# Patient Record
Sex: FEMALE | Race: WHITE | NOT HISPANIC OR LATINO | Employment: UNEMPLOYED | URBAN - NONMETROPOLITAN AREA
[De-identification: names, ages, dates, MRNs, and addresses within clinical notes are randomized per-mention and may not be internally consistent; named-entity substitution may affect disease eponyms.]

---

## 2020-09-15 ENCOUNTER — TRANSCRIBE ORDERS (OUTPATIENT)
Dept: ADMINISTRATIVE | Facility: HOSPITAL | Age: 35
End: 2020-09-15

## 2020-09-15 DIAGNOSIS — R51.9 HEADACHE AROUND THE EYES: Primary | ICD-10-CM

## 2020-09-24 ENCOUNTER — APPOINTMENT (OUTPATIENT)
Dept: CT IMAGING | Facility: HOSPITAL | Age: 35
End: 2020-09-24

## 2020-10-02 ENCOUNTER — APPOINTMENT (OUTPATIENT)
Dept: CT IMAGING | Facility: HOSPITAL | Age: 35
End: 2020-10-02

## 2022-04-14 ENCOUNTER — OFFICE VISIT (OUTPATIENT)
Dept: OBSTETRICS AND GYNECOLOGY | Facility: CLINIC | Age: 37
End: 2022-04-14

## 2022-04-14 VITALS
SYSTOLIC BLOOD PRESSURE: 122 MMHG | HEIGHT: 65 IN | BODY MASS INDEX: 36.69 KG/M2 | DIASTOLIC BLOOD PRESSURE: 86 MMHG | WEIGHT: 220.2 LBS

## 2022-04-14 DIAGNOSIS — Z01.419 WOMEN'S ANNUAL ROUTINE GYNECOLOGICAL EXAMINATION: Primary | ICD-10-CM

## 2022-04-14 PROCEDURE — 99385 PREV VISIT NEW AGE 18-39: CPT | Performed by: OBSTETRICS & GYNECOLOGY

## 2022-04-14 RX ORDER — NORGESTIMATE AND ETHINYL ESTRADIOL 0.25-0.035
1 KIT ORAL DAILY
Qty: 90 TABLET | Refills: 4 | Status: SHIPPED | OUTPATIENT
Start: 2022-04-14 | End: 2023-02-27 | Stop reason: SDUPTHER

## 2022-04-14 RX ORDER — NORGESTIMATE AND ETHINYL ESTRADIOL 0.25-0.035
1 KIT ORAL DAILY
COMMUNITY
Start: 2022-03-08 | End: 2022-04-14 | Stop reason: SDUPTHER

## 2022-04-14 NOTE — PROGRESS NOTES
GYN Annual Exam     CC - Here for annual exam. Patient is a new patient.     Subjective   HPI  Erica Hunter is a 36 y.o. female, , who presents for annual well woman exam.   Patient's last menstrual period was 2022 (within days). Periods are regular every 25-35 days, lasting 4-5 days. The patient uses less than 1 of tampons/pads per hour.   Dysmenorrhea:mild, occurring first 1-2 days of flow.  Patient reports problems with: none.    Partner Status: Marital Status: . Desires STD Screening: no.  HPV vaccinated? : no    Additional OB/GYN History   Current contraception: contraceptive methods: OCP (estrogen/progesterone)  Desires to: continue contraception    Last Pap :  - Normal - Completed at [facility]  Last Completed Pap Smear     This patient has no relevant Health Maintenance data.        History of abnormal Pap smear: no  Family history of uterine, colon, breast, or ovarian cancer: yes - MGM and PGM - breast cancer   Performs monthly Self-Breast Exam: yes  Exercises Regularly:yes  Feelings of Anxiety or Depression: yes - Anxiety - managed per pt   Tobacco Usage?: No   OB History        3    Para   3    Term   3            AB        Living   3       SAB        IAB        Ectopic        Molar        Multiple        Live Births   3                Health Maintenance   Topic Date Due   • Annual Gynecologic Pelvic and Breast Exam  Never done   • ANNUAL PHYSICAL  Never done   • COVID-19 Vaccine (1) Never done   • TDAP/TD VACCINES (1 - Tdap) Never done   • HEPATITIS C SCREENING  Never done   • PAP SMEAR  Never done   • INFLUENZA VACCINE  2022   • Pneumococcal Vaccine 0-64  Aged Out       The additional following portions of the patient's history were reviewed and updated as appropriate: allergies, current medications, past family history, past medical history, past social history and past surgical history.    Review of Systems    I have reviewed and agree with the HPI,  "ROS, and historical information as entered above. Mey Galindo MD    Objective   /86 (BP Location: Left arm, Patient Position: Sitting, Cuff Size: Adult)   Ht 165.1 cm (65\")   Wt 99.9 kg (220 lb 3.2 oz)   LMP 03/23/2022 (Within Days)   BMI 36.64 kg/m²     Physical Exam    General:  well developed; well nourished  no acute distress  Skin:  No suspicious lesions seen  Thyroid: normal to inspection and palpation  Breasts:  Examined in supine position  Symmetric without masses or skin dimpling  Nipples normal without inversion, lesions or discharge  There are no palpable axillary nodes  CVS: RRR, no M/R/G, distal pulses wnl  Resp: CTAB, No W/R/R  Abdomen: soft, non-tender; no masses  no umbilical or inguinal hernias are present  no hepato-splenomegaly  Pelvis: Clinical staff was present for exam  External genitalia:  normal appearance of the external genitalia including Bartholin's and [place]'s glands.  Urethra: no masses or tenderness  Urethral meatus: normal size;  No lesions or signs of prolapse  Bladder: non tender to palpation, no masses, no prolapse  Vagina:  normal pink mucosa without prolapse or lesions.  Cervix:  normal appearance.  Uterus:  normal size, shape and consistency.  Adnexa:  normal bimanual exam of the adnexa.  Perineum/Anus: normal appearance, no external hemorrhoids  Ext: 2+ pulses, no edema    Assessment/Plan     Assessment     Problem List Items Addressed This Visit     Women's annual routine gynecological examination - Primary    Relevant Orders    Pap IG, HPV-hr          1. GYN annual well woman exam    Plan     1. Reviewed Pap screening guidelines  2. Pap with HPV done/declines STD screening  3. OCP's/Patch/Vaginal Ring - Discussed side effects of nausea, BTB, headaches, breast tenderness and slight weight gain in the first three cycles.  Understands risks of blood clots, stroke, and theoretical risk of breast cancer.  Denies family history of blood clots.  4. Reviewed exercise " and healthy diet as a preventative health measures.   5. RTC in 1 year or PRN with problems  6. Other: Discussed possible IUD in the furture but she hopes the vasectomy happens      Mey Galindo MD  04/14/2022

## 2022-04-21 DIAGNOSIS — Z01.419 WOMEN'S ANNUAL ROUTINE GYNECOLOGICAL EXAMINATION: ICD-10-CM

## 2022-04-29 ENCOUNTER — TELEPHONE (OUTPATIENT)
Dept: OBSTETRICS AND GYNECOLOGY | Facility: CLINIC | Age: 37
End: 2022-04-29

## 2022-04-29 NOTE — TELEPHONE ENCOUNTER
PT said her pap results came back on her MyChart last week and no one has reached out to her about this. She would like to speak to someone

## 2022-06-07 ENCOUNTER — TELEPHONE (OUTPATIENT)
Dept: OBSTETRICS AND GYNECOLOGY | Facility: CLINIC | Age: 37
End: 2022-06-07

## 2022-06-07 NOTE — TELEPHONE ENCOUNTER
Spoke with patient regarding Dr. Graves recommendations from pap smear results. Patient needs to have colposcopy. Appt made. Patient V/U.

## 2022-06-24 ENCOUNTER — OFFICE VISIT (OUTPATIENT)
Dept: OBSTETRICS AND GYNECOLOGY | Facility: CLINIC | Age: 37
End: 2022-06-24

## 2022-06-24 VITALS — WEIGHT: 216 LBS | BODY MASS INDEX: 35.94 KG/M2

## 2022-06-24 DIAGNOSIS — R87.612 LGSIL ON PAP SMEAR OF CERVIX: Primary | ICD-10-CM

## 2022-06-24 LAB
B-HCG UR QL: NEGATIVE
EXPIRATION DATE: NORMAL
INTERNAL NEGATIVE CONTROL: NORMAL
INTERNAL POSITIVE CONTROL: NORMAL
Lab: NORMAL

## 2022-06-24 PROCEDURE — 81025 URINE PREGNANCY TEST: CPT | Performed by: OBSTETRICS & GYNECOLOGY

## 2022-06-24 PROCEDURE — 57452 EXAM OF CERVIX W/SCOPE: CPT | Performed by: OBSTETRICS & GYNECOLOGY

## 2022-06-24 RX ORDER — ONDANSETRON 4 MG/1
TABLET, ORALLY DISINTEGRATING ORAL
COMMUNITY
Start: 2022-06-06 | End: 2022-12-14

## 2022-06-24 RX ORDER — ESCITALOPRAM OXALATE 10 MG/1
10 TABLET ORAL DAILY
COMMUNITY
Start: 2022-06-06

## 2022-06-24 NOTE — PROGRESS NOTES
Colposcopy Procedure Note  Procedures    Indications: Erica Hunter is a 36 y.o. female, , whose Patient's last menstrual period was 06/15/2022..  She presents for follow up for evaluation of an abnormal PAP smear that showed low-grade squamous intraepithelial neoplasia (LGSIL - encompassing HPV,mild dysplasia,PAOLA I) but HPV testing was negative..  She understands the need for the procedure and is aware of the complications, including post-colposcopic vaginal bleeding, vaginal leukorrhea or cervicitis.  She is aware she may experience discomfort.  After being presented with the risk, benefits, and alternatives the patient wished to proceed.      Patient states she and  have been together all their lives and with no one else.    Prior cervical treatment: no treatment.    UPT: negative    Procedure Details   The risks and benefits of the procedure and Verbal informed consent obtained.  She was positioned in the dorsal lithotomy position and a speculum was inserted into the vagina and excellent visualization of cervix achieved, cervix swabbed x 3 with acetic acid solution. The transformation zone was completely visualized.  A cervical biopsy was not obtained.  An endocervical curettage was not performed.  This colposcopy was satisfactory.     The patient tolerated the procedure well.    Findings:  The procedure was notable for:  Physical Exam     Normal colposcopy exam    Specimens: none  Specimens labelled and sent to Pathology.    Complications: none.    Assessment and Plan    Problem List Items Addressed This Visit     LGSIL on Pap smear of cervix - Primary    Overview     But HPV neg           Relevant Orders    POC Pregnancy, Urine (Completed)          1. Will base further treatment on Pathology findings.  2. Treatment options discussed with patient.  3. Post biopsy instructions given to patient.  4. Repeat PAP in 12 months.     Mey Galindo MD  2022

## 2023-02-21 ENCOUNTER — TELEPHONE (OUTPATIENT)
Dept: OBSTETRICS AND GYNECOLOGY | Facility: CLINIC | Age: 38
End: 2023-02-21
Payer: COMMERCIAL

## 2023-02-21 DIAGNOSIS — R05.1 ACUTE COUGH: ICD-10-CM

## 2023-02-21 RX ORDER — ESCITALOPRAM OXALATE 10 MG/1
10 TABLET ORAL DAILY
OUTPATIENT
Start: 2023-02-21

## 2023-02-21 RX ORDER — NORGESTIMATE AND ETHINYL ESTRADIOL 0.25-0.035
1 KIT ORAL DAILY
Qty: 90 TABLET | Refills: 4 | OUTPATIENT
Start: 2023-02-21

## 2023-02-21 RX ORDER — AZITHROMYCIN 250 MG/1
TABLET, FILM COATED ORAL
Qty: 6 TABLET | Refills: 0 | OUTPATIENT
Start: 2023-02-21

## 2023-02-21 NOTE — TELEPHONE ENCOUNTER
Caller: EULOGIO, PHARMACIST      Pharmacy where request should be sent    [facility]  [address] [place] [address]    [phone]         Requested Prescriptions     Pending Prescriptions Disp Refills   • Estarylla 0.25-35 MG-MCG per tablet 90 tablet 4     Sig: Take 1 tablet by mouth Daily.   • escitalopram (LEXAPRO) 10 MG tablet       Sig: Take 1 tablet by mouth Daily.   • azithromycin (ZITHROMAX) 250 MG tablet 6 tablet 0     Sig: Take 2 tablets the first day, then 1 tablet daily for 4 days.        Additional details provided by patient:     PT HAS RELOCATED TO [place] AND IS NEEDING HER RX SENT THERE.  [facility]    Does the patient have less than a 3 day supply:  [x] Yes  [] No    Would you like a call back once the refill request has been completed: [x] Yes [] No    If the office needs to give you a call back, can they leave a voicemail: [x] Yes [] No    Renetta Motley Rep   02/21/23 14:09 EST

## 2023-02-21 NOTE — TELEPHONE ENCOUNTER
Dr. Galindo pt.     Last annual: 4/14/2022  Next annual: not scheduled.    RX of estarylla was sent on 4/14/2022 for 90 day supply with 4 RF's. Patient should have enough until annual appt due in 4/2023.

## 2023-02-24 ENCOUNTER — TELEPHONE (OUTPATIENT)
Dept: OBSTETRICS AND GYNECOLOGY | Facility: CLINIC | Age: 38
End: 2023-02-24
Payer: COMMERCIAL

## 2023-02-24 NOTE — TELEPHONE ENCOUNTER
[facility] called and said the script that he called about a few days ago has never made it to their pharmacy, he believes there was [facility] because they are unable to send over the refill

## 2023-02-27 RX ORDER — NORGESTIMATE AND ETHINYL ESTRADIOL 0.25-0.035
1 KIT ORAL DAILY
Qty: 90 TABLET | Refills: 0 | Status: SHIPPED | OUTPATIENT
Start: 2023-02-27

## 2023-02-27 NOTE — TELEPHONE ENCOUNTER
Last annual: 4/14/2022  Next annual: not scheduled    S/w [facility] in [place] and they said they need the estarylla RX sent to them until upcoming annual due.     I v/u.     RX sent.

## 2023-06-02 RX ORDER — NORGESTIMATE AND ETHINYL ESTRADIOL 0.25-0.035
1 KIT ORAL DAILY
Qty: 90 TABLET | Refills: 0 | Status: SHIPPED | OUTPATIENT
Start: 2023-06-02

## 2023-06-02 NOTE — TELEPHONE ENCOUNTER
Patient called stating she has recently moved out of state to [place]. She has an appointment for her Annual at a OB/GYN office there. However, she was not able to get in until the end of July. Patient is almost out of her birth control. Patient requested us to send in a refill until her appointment. I have advised patient we can send this in but since she is out of state and had [facility] insurance she may be responsible for having to pay out of pocket for her birth control. Patient was agreeable to this and v/u. I have sent this in to the requested RX. Patient has also asked how she can get her medical records from us. I have advised patient to call medical records and the office she is going to may send a release of information form to us to fill out in order to get them.

## 2023-07-24 RX ORDER — NORGESTIMATE AND ETHINYL ESTRADIOL 0.25-0.035
1 KIT ORAL DAILY
Qty: 90 TABLET | Refills: 0 | OUTPATIENT
Start: 2023-07-24

## 2023-08-23 ENCOUNTER — OFFICE VISIT (OUTPATIENT)
Dept: OBSTETRICS AND GYNECOLOGY | Facility: CLINIC | Age: 38
End: 2023-08-23
Payer: COMMERCIAL

## 2023-08-23 VITALS
HEIGHT: 65 IN | DIASTOLIC BLOOD PRESSURE: 72 MMHG | SYSTOLIC BLOOD PRESSURE: 120 MMHG | BODY MASS INDEX: 38.49 KG/M2 | WEIGHT: 231 LBS

## 2023-08-23 DIAGNOSIS — Z01.419 ROUTINE GYNECOLOGICAL EXAMINATION: Primary | ICD-10-CM

## 2023-08-23 DIAGNOSIS — Z87.42 HISTORY OF ABNORMAL CERVICAL PAP SMEAR: ICD-10-CM

## 2023-08-23 DIAGNOSIS — E66.9 OBESITY (BMI 30-39.9): ICD-10-CM

## 2023-08-23 RX ORDER — OMEPRAZOLE 40 MG/1
40 CAPSULE, DELAYED RELEASE ORAL DAILY
COMMUNITY

## 2023-08-23 NOTE — PROGRESS NOTES
Gynecologic Annual Exam Note        Annual Exam        Subjective     HPI  Erica Hunter is a 37 y.o.  female who presents for annual well woman exam as a established patient. There were no changes to her medical or surgical history since her last visit.. Patient reports problems with: none. Patient's last menstrual period was 08/10/2023 (exact date).. Her periods occur every 25-35 days , lasting 4 days. The flow is moderate.. She reports dysmenorrhea is mild, occurring premenstrually and first 1-2 days of flow.     Partner Status: Marital Status: .  She is sexually active. She has not had new partners.. STD testing recommendations have been explained to the patient and she does not desire STD testing.    Additional OB/GYN History   Current contraception: contraceptive methods: OCP (estrogen/progesterone)  Desires to: continue contraception  Thromboembolic Disease: none      History of STD: no    Last Pap : 22. Results: LSIL. HPV: negative.   Last Completed Pap Smear            Ordered - PAP SMEAR (Every 3 Years) Ordered on 2022  PAP SMEAR SCANNED                     History of abnormal Pap smear: yes - LSIL -HPV  Gardasil status:missed  Family history of uterine, colon, breast, or ovarian cancer: no  Performs monthly Self-Breast Exam: yes  Exercises Regularly:no  Feelings of Anxiety or Depression: yes we  prescribe Lexapro  Tobacco Usage?: No       Current Outpatient Medications:     azithromycin (ZITHROMAX) 250 MG tablet, Take 2 tablets the first day, then 1 tablet daily for 4 days., Disp: 6 tablet, Rfl: 0    escitalopram (LEXAPRO) 10 MG tablet, Take 10 mg by mouth Daily., Disp: , Rfl:     Estarylla 0.25-35 MG-MCG per tablet, Take 1 tablet by mouth Daily., Disp: 90 tablet, Rfl: 0    omeprazole (priLOSEC) 40 MG capsule, Take 1 capsule by mouth Daily. Take one tablet daily, Disp: , Rfl:      Patient is requesting refills of Lexapro and Omeprazole.    OB History        "   3    Para   3    Term   3            AB        Living   3         SAB        IAB        Ectopic        Molar        Multiple        Live Births   3                Health Maintenance   Topic Date Due    COVID-19 Vaccine (1) Never done    TDAP/TD VACCINES (1 - Tdap) Never done    HEPATITIS C SCREENING  Never done    ANNUAL PHYSICAL  Never done    Annual Gynecologic Pelvic and Breast Exam  2023    INFLUENZA VACCINE  10/01/2023    PAP SMEAR  2025    Pneumococcal Vaccine 0-64  Aged Out       Past Medical History:   Diagnosis Date    BMI 31.0-31.9,adult     Encounter for gynecological examination with Papanicolaou smear of cervix     IUD contraception 2018    Inserted 2018 and it fell out 2019     (spontaneous vaginal delivery)     x3        History reviewed. No pertinent surgical history.    The additional following portions of the patient's history were reviewed and updated as appropriate: allergies, current medications, past family history, past medical history, past social history, past surgical history, and problem list.    Review of Systems   All other systems reviewed and are negative.      I have reviewed and agree with the HPI, ROS, and historical information as entered above. Mey Galindo MD          Objective   /72   Ht 165.1 cm (65\")   Wt 105 kg (231 lb)   LMP 08/10/2023 (Exact Date)   BMI 38.44 kg/mý     Physical Exam  General:  well developed; well nourished  no acute distress  Skin:  No suspicious lesions seen  Thyroid: normal to inspection and palpation  Breasts:  Examined in supine position  Symmetric without masses or skin dimpling  Nipples normal without inversion, lesions or discharge  There are no palpable axillary nodes  CVS: RRR, no M/R/G, distal pulses wnl  Resp: CTAB, No W/R/R  Abdomen: soft, non-tender; no masses  no umbilical or inguinal hernias are present  no hepato-splenomegaly  Pelvis: Clinical staff was present for exam  External " genitalia:  normal appearance of the external genitalia including Bartholin's and [place]'s glands.  Urethra: no masses or tenderness  Urethral meatus: normal size;  No lesions or signs of prolapse  Bladder: non tender to palpation, no masses, no prolapse  Vagina:  normal pink mucosa without prolapse or lesions.  Cervix:  normal appearance.  Uterus:  normal size, shape and consistency.  Adnexa:  normal bimanual exam of the adnexa.  Perineum/Anus: normal appearance, no external hemorrhoids  Ext: 2+ pulses, no edema       Assessment and Plan    Problem List Items Addressed This Visit       History of abnormal cervical Pap smear    Relevant Orders    LIQUID-BASED PAP SMEAR WITH HPV GENOTYPING IF ASCUS ([facility])    Routine gynecological examination - Primary    Relevant Orders    LIQUID-BASED PAP SMEAR WITH HPV GENOTYPING IF ASCUS ([facility])    Obesity (BMI 30-39.9)       GYN annual well woman exam.   Reviewed pap guidelines.   Reviewed risks/benefits of hormonal contraception after age 35, including possible increased risk of breast cancer, heart disease, blood clots and strokes.  Patient strongly desires to stay on hormonal contraception.  Reviewed monthly self breast exams.  Instructed to call with lumps, pain, or breast discharge.    Reviewed BMI and weight loss as preventative health measures.   Return in about 1 year (around 8/23/2024) for Annual physical.      Mey Galindo MD  08/23/2023

## 2023-08-25 LAB — REF LAB TEST METHOD: NORMAL

## 2023-08-28 DIAGNOSIS — Z01.419 ROUTINE GYNECOLOGICAL EXAMINATION: Primary | ICD-10-CM

## 2023-08-30 LAB — REF LAB TEST METHOD: NORMAL

## 2023-08-31 ENCOUNTER — PATIENT MESSAGE (OUTPATIENT)
Dept: OBSTETRICS AND GYNECOLOGY | Facility: CLINIC | Age: 38
End: 2023-08-31
Payer: COMMERCIAL

## 2023-08-31 RX ORDER — NORGESTIMATE AND ETHINYL ESTRADIOL 0.25-0.035
1 KIT ORAL DAILY
Qty: 90 TABLET | Refills: 0 | Status: SHIPPED | OUTPATIENT
Start: 2023-08-31

## 2023-08-31 NOTE — PROGRESS NOTES
He reviewed slides again with another pathologist and both agree HPV effect.  Said likely a low risk HPV variant.

## 2023-08-31 NOTE — PROGRESS NOTES
This is quite strange.  I'm calling to ask pathologist.  But also, recommendations are just to Repeat pap with HPV 1 year.

## 2023-09-20 RX ORDER — ESCITALOPRAM OXALATE 10 MG/1
10 TABLET ORAL DAILY
Qty: 30 TABLET | Refills: 11 | Status: SHIPPED | OUTPATIENT
Start: 2023-09-20

## 2023-09-20 RX ORDER — OMEPRAZOLE 40 MG/1
40 CAPSULE, DELAYED RELEASE ORAL DAILY
Qty: 30 CAPSULE | Refills: 11 | Status: SHIPPED | OUTPATIENT
Start: 2023-09-20

## 2023-09-20 NOTE — TELEPHONE ENCOUNTER
Lexapro and Omeprazole was not prescribed by us. Pt states she spoke with Dr. Galindo about this at her last visit. Informed I would send to Dr. Galindo to ensure she is ok with this. She VU.      Pt will try to send paper that needs filled out via [employer] and I can look at it to see if it is something we can fill out.

## 2023-09-20 NOTE — TELEPHONE ENCOUNTER
Provider: DR CHEW    Caller: BRITTANY GREEN    Relationship to Patient: SELF         Reason for Call: PT WOULD LIKE TO KNOW IF WE CAN FILL OUT A PHYSICAL SHE NEEDS FOR WORK - PLEASE CALL PT TO ADVISE     PT ALSO NEVER GOT MEDICATION REFILLS ON LEXAPRO AND OMEPRAZOLE AND WOULD LIKE THEM REFILLED AS WELL     SHE IS USING THE [facility] IN [place]

## 2023-11-01 RX ORDER — NORGESTIMATE AND ETHINYL ESTRADIOL 0.25-0.035
1 KIT ORAL DAILY
Qty: 90 TABLET | Refills: 0 | Status: SHIPPED | OUTPATIENT
Start: 2023-11-01

## 2023-11-29 RX ORDER — NORGESTIMATE AND ETHINYL ESTRADIOL 0.25-0.035
1 KIT ORAL DAILY
Qty: 90 TABLET | Refills: 0 | Status: SHIPPED | OUTPATIENT
Start: 2023-11-29

## 2024-04-16 RX ORDER — NORGESTIMATE AND ETHINYL ESTRADIOL 0.25-0.035
1 KIT ORAL DAILY
Qty: 84 TABLET | Refills: 0 | Status: SHIPPED | OUTPATIENT
Start: 2024-04-16

## 2024-05-28 RX ORDER — NORGESTIMATE AND ETHINYL ESTRADIOL 0.25-0.035
1 KIT ORAL DAILY
Qty: 84 TABLET | Refills: 3 | Status: SHIPPED | OUTPATIENT
Start: 2024-05-28

## 2024-05-28 NOTE — TELEPHONE ENCOUNTER
Sent in refills for the year. Her annual was 4/11/24 and she was hoping for a vasectomy before she needed refills

## 2024-08-02 RX ORDER — NORGESTIMATE AND ETHINYL ESTRADIOL 0.25-0.035
1 KIT ORAL DAILY
Qty: 28 TABLET | Refills: 1 | OUTPATIENT
Start: 2024-08-02

## 2024-08-08 RX ORDER — NORGESTIMATE AND ETHINYL ESTRADIOL 0.25-0.035
1 KIT ORAL DAILY
Qty: 84 TABLET | Refills: 0 | Status: SHIPPED | OUTPATIENT
Start: 2024-08-08

## 2024-10-21 RX ORDER — NORGESTIMATE AND ETHINYL ESTRADIOL 0.25-0.035
1 KIT ORAL DAILY
Qty: 84 TABLET | Refills: 0 | OUTPATIENT
Start: 2024-10-21

## 2024-10-29 RX ORDER — NORGESTIMATE AND ETHINYL ESTRADIOL 0.25-0.035
1 KIT ORAL DAILY
Qty: 84 TABLET | Refills: 0 | OUTPATIENT
Start: 2024-10-29